# Patient Record
Sex: MALE
[De-identification: names, ages, dates, MRNs, and addresses within clinical notes are randomized per-mention and may not be internally consistent; named-entity substitution may affect disease eponyms.]

---

## 2019-05-09 ENCOUNTER — TRANSCRIPTION ENCOUNTER (OUTPATIENT)
Age: 44
End: 2019-05-09

## 2019-11-26 PROBLEM — Z00.00 ENCOUNTER FOR PREVENTIVE HEALTH EXAMINATION: Status: ACTIVE | Noted: 2019-11-26

## 2019-12-11 ENCOUNTER — TRANSCRIPTION ENCOUNTER (OUTPATIENT)
Age: 44
End: 2019-12-11

## 2020-01-30 ENCOUNTER — APPOINTMENT (OUTPATIENT)
Dept: OTOLARYNGOLOGY | Facility: CLINIC | Age: 45
End: 2020-01-30
Payer: COMMERCIAL

## 2020-01-30 VITALS
DIASTOLIC BLOOD PRESSURE: 85 MMHG | HEIGHT: 66 IN | WEIGHT: 190 LBS | BODY MASS INDEX: 30.53 KG/M2 | SYSTOLIC BLOOD PRESSURE: 141 MMHG | HEART RATE: 72 BPM

## 2020-01-30 PROCEDURE — 99204 OFFICE O/P NEW MOD 45 MIN: CPT | Mod: 25

## 2020-01-30 PROCEDURE — 31575 DIAGNOSTIC LARYNGOSCOPY: CPT

## 2020-02-11 NOTE — HISTORY OF PRESENT ILLNESS
[de-identified] : 44M here for initial evaluation.\par \par He c/o constant coughing and throat clearing for the past 3 years or so. The coughing is throughout the day, unrelated to meals or food, unrelated to body position.\par He initially underwent left sided sinus surgery in [facility] 10/2017. Although his coughing/throat clearing improved initially, they then returned to the same degree and have persisted. There is no green rhinorrhea, nasal congestion, change in olfaction or foul nasal odor. He has no sinus related complaints today.\par During this time, he has been treated for reflux (occ H2, PPI) and also had normal EGD. His diet is not conducive to reflux control and management. SLP treatment did not help either. He has also been treated with multiple courses of abx without improvement in sx. Allergy testing prior to ESS was positive for pollen/trees, but was unable to continue with shots.\par \par No recent imaging.\par \par ROS otherwise unremarkable.

## 2020-02-11 NOTE — ASSESSMENT
[FreeTextEntry1] : 44M here for initial evaluation. He c/o constant coughing and throat clearing for the past 3 years or so. The coughing is throughout the day, unrelated to meals or food, unrelated to body position. During this time, he has been treated for sinusitis, reflux and allergy and his sx persist-\par He initially underwent left sided sinus surgery in [facility] 10/2017. Although his coughing/throat clearing improved initially, they then returned to the same degree and have persisted. There is no green rhinorrhea, nasal congestion, change in olfaction or foul nasal odor. He has no sinus related complaints today.\par During this time, he has been treated for reflux (occ H2, PPI) and also had normal EGD. However, his diet is not conducive to reflux control and management. SLP treatment did not help either. He has also been treated with multiple courses of abx without improvement in sx. Allergy testing prior to ESS was positive for pollen/trees, but was unable to continue with shots; he has no hayfever sx. There is no recent imaging.\par On exam, nasal endoscopy shows mild turbinate hypertrophy and a patent left antrostomy with thick recirculation mucus and robust thick clear postnasal drip, which was suctioned. 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 That does not mean that he does not have any reflux and/or postnasal drip; on the contrary, he probably has mild reflux and on exam there is also a robust postnasal drip, which was suctioned.\par On the one hand, we can try minimizing reflux w diet/lifestyle and PPIs, in addition to putting him on nasal rinses, but he has done these with varying degrees already. So, I would preemptively treat for SNC and start 10mg amitriptyline at night and monitor for improvement; side effect profile discussed. Should he have improvement, will titrate up dose. I will touch base w pt in 4 weeks. Also try to maintain healthy diet/lifestyle as well and to use nasal rinse for the postnasal drip (likely from recirculation mucus from prior surgery). All questions answered.

## 2020-02-11 NOTE — PROCEDURE
[FreeTextEntry3] : Nasal Endoscopy:\par mild turbinate hypertrophy\par patent left antrostomy\par thick recirculation mucus; thick clear postnasal drip, suctioned\par \par Fiberoptic Laryngoscopy:\par upper airway widely patent\par TVF symmetric and mobile\par no masses/lesions

## 2020-03-05 ENCOUNTER — APPOINTMENT (OUTPATIENT)
Dept: OTOLARYNGOLOGY | Facility: CLINIC | Age: 45
End: 2020-03-05
Payer: COMMERCIAL

## 2020-03-05 VITALS
BODY MASS INDEX: 32.3 KG/M2 | HEART RATE: 72 BPM | HEIGHT: 66 IN | WEIGHT: 201 LBS | SYSTOLIC BLOOD PRESSURE: 120 MMHG | DIASTOLIC BLOOD PRESSURE: 74 MMHG | TEMPERATURE: 98.4 F

## 2020-03-05 PROCEDURE — 31575 DIAGNOSTIC LARYNGOSCOPY: CPT

## 2020-03-05 PROCEDURE — 99214 OFFICE O/P EST MOD 30 MIN: CPT | Mod: 25

## 2020-03-05 RX ORDER — PANTOPRAZOLE 40 MG/1
40 TABLET, DELAYED RELEASE ORAL TWICE DAILY
Qty: 60 | Refills: 2 | Status: ACTIVE | COMMUNITY
Start: 2020-03-05 | End: 1900-01-01

## 2020-03-05 RX ORDER — BUDESONIDE 0.5 MG/2ML
0.5 INHALANT ORAL
Qty: 1 | Refills: 2 | Status: ACTIVE | COMMUNITY
Start: 2020-03-05 | End: 1900-01-01

## 2020-03-05 NOTE — HISTORY OF PRESENT ILLNESS
[de-identified] : 44M here in followup.\par \par Since last seen 6 weeks ago, he has been on the amitriptyline at night and he feels his coughing/throat clearing are improved by around 30%. \par He is also on occasional nasal saline lavages.\par \par He c/o constant coughing and throat clearing for the past 3 years or so. The coughing is throughout the day, unrelated to meals or food, unrelated to body position.\par He initially underwent left sided sinus surgery in [facility] 10/2017. Although his coughing/throat clearing improved initially, they then returned to the same degree and have persisted. There is no green rhinorrhea, nasal congestion, change in olfaction or foul nasal odor. He has no sinus related complaints today.\par During this time, he has been treated for reflux (occ H2, PPI) and also had normal EGD. His diet is not conducive to reflux control and management. SLP treatment did not help either. He has also been treated with multiple courses of abx without improvement in sx. Allergy testing prior to ESS was positive for pollen/trees, but was unable to continue with shots.\par \par No recent imaging.\par \par ROS otherwise unremarkable.

## 2020-03-05 NOTE — ASSESSMENT
[FreeTextEntry1] : 44M here in followup. Since last seen 6 weeks ago, he has been on the amitriptyline at night and he feels his coughing/throat clearing are improved by around 30%. He is also on occasional nasal saline lavages.\par \par Initially seen w c/o constant coughing and throat clearing for the past 3 years or so. The coughing is throughout the day, unrelated to meals or food, unrelated to body position. During this time, he has been treated for sinusitis, reflux and allergy and his sx persist-\par He initially underwent left sided sinus surgery in [facility] 10/2017. Although his coughing/throat clearing improved initially, they then returned to the same degree and have persisted. There is no green rhinorrhea, nasal congestion, change in olfaction or foul nasal odor. During this time, he has been treated for reflux (occ H2, PPI) and also had normal EGD. However, his diet is not conducive to reflux control and management. SLP treatment did not help either. He has also been treated with multiple courses of abx without improvement in sx. Allergy testing prior to ESS was positive for pollen/trees, but was unable to continue with shots; he has no hayfever sx. There is no recent imaging.\par On exam, nasal endoscopy shows mild turbinate hypertrophy and a patent left antrostomy with thick recirculation mucus and clear postnasal drip, which was suctioned. The left antrum is patent but lined w edematous mucosa. The rest of the complete and comprehensive head and neck exam, including fiberoptic laryngoscopy, is unremarkable.\par He is 30% improved on the amitriptyline. 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 That does not mean that he does not have any reflux and/or postnasal drip; on the contrary, he probably has mild reflux and on exam there is also a robust postnasal drip, which was suctioned again today.\par For now, continue current meds. I will also maximize reflux and postnasal treatment w BID PPI and daily budesonide rinses, which was discussed - to see how much additional improvement he has until w have to increase the amitriptyline. I will touch base w pt in 5-6 weeks. Maintain healthy diet/lifestyle as well.

## 2021-05-25 ENCOUNTER — APPOINTMENT (OUTPATIENT)
Dept: OTOLARYNGOLOGY | Facility: CLINIC | Age: 46
End: 2021-05-25
Payer: COMMERCIAL

## 2021-05-25 VITALS
DIASTOLIC BLOOD PRESSURE: 82 MMHG | HEART RATE: 80 BPM | WEIGHT: 190 LBS | TEMPERATURE: 97.8 F | HEIGHT: 66 IN | BODY MASS INDEX: 30.53 KG/M2 | SYSTOLIC BLOOD PRESSURE: 135 MMHG

## 2021-05-25 DIAGNOSIS — K21.00 GASTRO-ESOPHAGEAL REFLUX DISEASE WITH ESOPHAGITIS, WITHOUT BLEEDING: ICD-10-CM

## 2021-05-25 PROCEDURE — 99072 ADDL SUPL MATRL&STAF TM PHE: CPT

## 2021-05-25 PROCEDURE — 31575 DIAGNOSTIC LARYNGOSCOPY: CPT

## 2021-05-25 PROCEDURE — 99214 OFFICE O/P EST MOD 30 MIN: CPT | Mod: 25

## 2021-05-25 RX ORDER — AMITRIPTYLINE HYDROCHLORIDE 10 MG/1
10 TABLET, FILM COATED ORAL
Qty: 60 | Refills: 2 | Status: ACTIVE | COMMUNITY
Start: 2021-05-25 | End: 1900-01-01

## 2021-05-25 RX ORDER — OMEPRAZOLE 40 MG/1
40 CAPSULE, DELAYED RELEASE ORAL
Qty: 30 | Refills: 5 | Status: ACTIVE | COMMUNITY
Start: 2021-05-25 | End: 1900-01-01

## 2021-05-25 NOTE — PROCEDURE
[FreeTextEntry3] : Nasal Endoscopy:\par moderate turbinate hypertrophy\par patent left aidan-antrostomy w mucosal edema; no drainage, no mucopus\par choana clear\par \par Fiberoptic Laryngoscopy:\par upper airway widely patent\par TVF symmetric and mobile\par full postcricoid region

## 2021-05-25 NOTE — ASSESSMENT
[FreeTextEntry1] : 46M here in followup. He was last seen >1 yr ago. Since then, he has been off all meds and his prior sx have all returned.\par He c/o constant coughing and throat clearing for the past 4 years or so. The coughing is throughout the day, unrelated to eating, time of day or body position.\par At prior visit 15 months ago, I had started him on amitriptyline at night and he felt his coughing/throat clearing were improved by around 30%. However, he was lost followup during the pandemic. He is not on any nasal sprays.\par He initially underwent left sided sinus surgery in [facility] 10/2017. Although his coughing/throat clearing improved initially, they then returned to the same degree and have persisted. There is no green rhinorrhea, nasal congestion, change in olfaction or foul nasal odor. He has no sinus related complaints today.\par During this time, he has been treated for reflux (occ H2, PPIs) and also had normal EGD. His diet is not conducive to reflux control and management. SLP treatment did not help either. He has also been treated with multiple courses of abx without improvement in sx. Allergy testing prior to ESS was positive for pollen/trees, but was unable to continue with shots.\par On exam, nasal endoscopy shows moderate inferior turbinate hypertrophy and a patent left aidan-antrostomy with mucosal edema, but no recirculation mucus nor postnasal drip. which was suctioned. Fiberoptic laryngoscopy shows a mildly full postcricoid region. The rest of the complete and comprehensive head and neck exam is unremarkable.\par I think his sx are multifactorial, but primarily due to sensory neuropathic cough; after all, >1yr ago, he had a 30% improvement while on the amitriptyline. I also think there is a component of reflux as well.\par For now, will restart the amitriptyline and will increase to 20mg at night. I will also restart BID PPI w strict diet/lifestyle. Encouraged exercise and weight loss which will help everything. RTO 3 months.

## 2021-05-25 NOTE — HISTORY OF PRESENT ILLNESS
[de-identified] : 46M here in followup.\par \par He was last seen >1 yr ago. Since then, he has been off all meds and his prior sx have all returned.\par \par He c/o constant coughing and throat clearing for the past 4 years or so. The coughing is throughout the day, unrelated to eating, time of day or body position.\par He initially underwent left sided sinus surgery in [facility] 10/2017. Although his coughing/throat clearing improved initially, they then returned to the same degree and have persisted. There is no green rhinorrhea, nasal congestion, change in olfaction or foul nasal odor. He has no sinus related complaints today.\par During this time, he has been treated for reflux (occ H2, PPIs) and also had normal EGD. His diet is not conducive to reflux control and management. SLP treatment did not help either. He has also been treated with multiple courses of abx without improvement in sx. Allergy testing prior to ESS was positive for pollen/trees, but was unable to continue with shots.\par At prior visit 15 months ago, I had started him on amitriptyline at night and he felt his coughing/throat clearing were improved by around 30%. However, he was lost followup during the pandemic.\par He is not on any nasal sprays.\par \par No recent imaging.\par \par ROS otherwise unremarkable.

## 2021-05-25 NOTE — PHYSICAL EXAM
[FreeTextEntry1] : frequent throat clearing and shallow coughing [de-identified] : soft, flat [Nasal Endoscopy Performed] : nasal endoscopy was performed, see procedure section for findings [Midline] : trachea located in midline position [Laryngoscopy Performed] : laryngoscopy was performed, see procedure section for findings [de-identified] : crowded opx [Normal] : no rashes

## 2021-09-14 ENCOUNTER — APPOINTMENT (OUTPATIENT)
Dept: OTOLARYNGOLOGY | Facility: CLINIC | Age: 46
End: 2021-09-14
Payer: COMMERCIAL

## 2021-09-14 PROCEDURE — 99214 OFFICE O/P EST MOD 30 MIN: CPT | Mod: 25

## 2021-09-14 PROCEDURE — 31575 DIAGNOSTIC LARYNGOSCOPY: CPT

## 2021-09-14 NOTE — PROCEDURE
[FreeTextEntry3] : Nasal Endoscopy:\par moderate turbinate hypertrophy\par patent left antrostomy w mucosal edema; mild mucus in antrum, no drainage, no mucopus\par choana clear\par \par Fiberoptic Laryngoscopy:\par upper airway widely patent\par TVF symmetric and mobile\par full postcricoid region

## 2021-09-14 NOTE — ASSESSMENT
[FreeTextEntry1] : 46M here in followup. Since last seen 3 months ago, he eports his ssx to be 40-50% improved. During that time, he was on 20mg amitriptyline and BID PPI and nightly H2B. He stopped all meds about one month ago and his sx have returned slightly.\par He c/o constant coughing and throat clearing for the past 4 years or so. The coughing is throughout the day, unrelated to eating, time of day or body position.\par He initially underwent left sided sinus surgery in [facility] 10/2017. Although his coughing/throat clearing improved initially, they then returned to the same degree and have persisted. There is no green rhinorrhea, nasal congestion, change in olfaction or foul nasal odor. He has no sinus related complaints today. Over the years he has been treated for reflux (occ H2, PPIs) and also had normal EGD ~2yrs ago. His diet is not conducive to reflux control and management. SLP treatment did not help either. He has also been treated with multiple courses of abx without improvement in sx. Allergy testing prior to ESS was positive for pollen/trees, but was unable to continue with shots.\par On exam, there are frequent throat clearing and shallow coughing. nasal endoscopy shows moderate inferior turbinate hypertrophy and a patent left antrostomy with mild mucosal edema and mucus, but no recirculation mucus nor postnasal drip. Fiberoptic laryngoscopy shows a mildly full postcricoid region. The rest of the complete and comprehensive head and neck exam is unremarkable.\par I think his sx are multifactorial, but primarily due to sensory neuropathic cough; after all, last year he had a 30% improvement while on the amitriptyline. I also think there may be component of reflux and postnasal drip as well, but I think these latter two are much more minor. For now, will restart the amitriptyline and will increase to 30mg at night. Stop the reflux meds but continue strict diet/lifestyle. RTO 1 month for reassessment.

## 2021-09-14 NOTE — PHYSICAL EXAM
[FreeTextEntry1] : frequent throat clearing and shallow coughing [de-identified] : soft, flat [Nasal Endoscopy Performed] : nasal endoscopy was performed, see procedure section for findings [Midline] : trachea located in midline position [Laryngoscopy Performed] : laryngoscopy was performed, see procedure section for findings [de-identified] : crowded opx [Normal] : no rashes

## 2021-10-12 ENCOUNTER — APPOINTMENT (OUTPATIENT)
Dept: OTOLARYNGOLOGY | Facility: CLINIC | Age: 46
End: 2021-10-12
Payer: COMMERCIAL

## 2021-10-12 VITALS
HEART RATE: 78 BPM | BODY MASS INDEX: 31.34 KG/M2 | TEMPERATURE: 97.2 F | HEIGHT: 66 IN | SYSTOLIC BLOOD PRESSURE: 129 MMHG | WEIGHT: 195 LBS | DIASTOLIC BLOOD PRESSURE: 85 MMHG

## 2021-10-12 DIAGNOSIS — R05.3 CHRONIC COUGH: ICD-10-CM

## 2021-10-12 PROCEDURE — 99214 OFFICE O/P EST MOD 30 MIN: CPT

## 2021-10-12 RX ORDER — AMITRIPTYLINE HYDROCHLORIDE 50 MG/1
50 TABLET, FILM COATED ORAL
Qty: 30 | Refills: 0 | Status: ACTIVE | COMMUNITY
Start: 2020-01-30 | End: 1900-01-01

## 2021-10-12 NOTE — PHYSICAL EXAM
[FreeTextEntry1] : frequent throat clearing and shallow coughing [de-identified] : soft, flat [Nasal Endoscopy Performed] : nasal endoscopy was performed, see procedure section for findings [Midline] : trachea located in midline position [Laryngoscopy Performed] : laryngoscopy was performed, see procedure section for findings [de-identified] : crowded opx [Normal] : no rashes

## 2021-10-12 NOTE — HISTORY OF PRESENT ILLNESS
[de-identified] : 46M here in followup.\par \par Since last seen 1 month ago, he has remained on 30mg amitriptyline nightly and stopped all reflux meds and feels his sx have improved another 10%. At prior visit, he had been on 20mg amitriptyline and was BID PPI and nightly H2B and felt sx were 40% improved.\par \par He c/o constant coughing and throat clearing for the past 4-5 years or so. The coughing is throughout the day, unrelated to eating, time of day or body position.\par He initially underwent left sided sinus surgery in [facility] 10/2017. Although his coughing/throat clearing improved initially, they then returned to the same degree and have persisted. There is no green rhinorrhea, nasal congestion, change in olfaction or foul nasal odor. He has no sinus related complaints today.\par In the past, he has been treated for reflux (occ H2, PPIs) and also had normal EGD ~2yrs ago. His diet is not conducive to reflux control and management. SLP treatment did not help either. He has also been treated with multiple courses of abx without improvement in sx. Allergy testing prior to ESS was positive for pollen/trees, but was unable to continue with shots.\par He is not on any nasal sprays.\par \par No recent imaging.\par \par ROS otherwise unremarkable.

## 2021-10-12 NOTE — ASSESSMENT
[FreeTextEntry1] : 46M here in followup. Since last seen 1 month ago, he has remained on 30mg amitriptyline nightly and stopped all reflux meds and feels his sx have improved another 10%. At prior visit, he had been on 20mg amitriptyline and was BID PPI and nightly H2B and felt sx were 40% improved.\par He c/o constant coughing and throat clearing for the past 4 years or so. The coughing is throughout the day, unrelated to eating, time of day or body position.\par He initially underwent left sided sinus surgery in [facility] 10/2017. Although his coughing/throat clearing improved initially, they then returned to the same degree and have persisted. There is no green rhinorrhea, nasal congestion, change in olfaction or foul nasal odor. He has no sinus related complaints today. Over the years he has been treated for reflux (occ H2, PPIs) and also had normal EGD ~2yrs ago. His diet is not conducive to reflux control and management. SLP treatment did not help either. He has also been treated with multiple courses of abx without improvement in sx. Allergy testing prior to ESS was positive for pollen/trees, but was unable to continue with shots.\par On exam, there is throat clearing and shallow coughing intermittently. Rhinoscopy shows moderate inferior turbinate hypertrophy. Nasal endoscopy from prior visit shows a patent left antrostomy with mild mucosal edema and mucus, but no recirculation mucus nor postnasal drip. Fiberoptic laryngoscopy from prior visit shows a mildly full postcricoid region. The rest of the complete and comprehensive head and neck exam is unremarkable.\par I think his sx are multifactorial, but primarily due to sensory neuropathic cough; after all, he is now 50% improved on amitriptyline without reflux meds the past month. I do think there may be component of reflux and postnasal drip as well, but I think these are much more minor. For now, will increase the amitriptyline to 50mg at night and continue strict diet/lifestyle. RTO 1 month for reassessment. Should sx remain, will proceed w CT sinus.

## 2021-12-17 ENCOUNTER — TRANSCRIPTION ENCOUNTER (OUTPATIENT)
Age: 46
End: 2021-12-17

## 2022-06-19 ENCOUNTER — NON-APPOINTMENT (OUTPATIENT)
Age: 47
End: 2022-06-19

## 2024-11-19 NOTE — HISTORY OF PRESENT ILLNESS
[de-identified] : 46M here in followup.\par \par Since last seen 3 months ago, he eports his ssx to be 40-50% improved. During that time, he was on 20mg amitriptyline and BID PPI and nightly H2B. He stopped all meds about one month ago and his sx have returned slightly.\par \par He c/o constant coughing and throat clearing for the past 4-5 years or so. The coughing is throughout the day, unrelated to eating, time of day or body position.\par He initially underwent left sided sinus surgery in [facility] 10/2017. Although his coughing/throat clearing improved initially, they then returned to the same degree and have persisted. There is no green rhinorrhea, nasal congestion, change in olfaction or foul nasal odor. He has no sinus related complaints today.\par In the past, he has been treated for reflux (occ H2, PPIs) and also had normal EGD ~2yrs ago. His diet is not conducive to reflux control and management. SLP treatment did not help either. He has also been treated with multiple courses of abx without improvement in sx. Allergy testing prior to ESS was positive for pollen/trees, but was unable to continue with shots.\par He is not on any nasal sprays.\par \par No recent imaging.\par \par ROS otherwise unremarkable. 19-Nov-2024 22:30